# Patient Record
Sex: FEMALE
[De-identification: names, ages, dates, MRNs, and addresses within clinical notes are randomized per-mention and may not be internally consistent; named-entity substitution may affect disease eponyms.]

---

## 2019-12-18 ENCOUNTER — HOSPITAL ENCOUNTER (OUTPATIENT)
Dept: HOSPITAL 92 - BICMAMMO | Age: 59
Discharge: HOME | End: 2019-12-18
Attending: FAMILY MEDICINE
Payer: COMMERCIAL

## 2019-12-18 DIAGNOSIS — Z13.820: ICD-10-CM

## 2019-12-18 DIAGNOSIS — Z12.31: Primary | ICD-10-CM

## 2019-12-18 DIAGNOSIS — M85.80: ICD-10-CM

## 2019-12-18 PROCEDURE — 77063 BREAST TOMOSYNTHESIS BI: CPT

## 2019-12-18 PROCEDURE — 77080 DXA BONE DENSITY AXIAL: CPT

## 2019-12-18 PROCEDURE — 77067 SCR MAMMO BI INCL CAD: CPT

## 2019-12-18 NOTE — BD
EXAM: Bone densitometry using DEXA



HISTORY:

58 yo female. Screening for postmenopausal osteoporosis



FINDINGS:

L1--bone mineral density 0.818 g/sq cm; T score -1.6 ; Z score -0.4



L2--bone mineral density 0.787 g/sq cm; T score -2.2 ; Z score -0.9



L3--bone mineral density 0.803 g/sq cm; T score -2.6 ; Z score -1.1



L4--bone mineral density 0.839 g/sq cm; T score -2.0 ; Z score -0.6



Total L1-L4--bone mineral density 0.8, 0.2 g/sq cm; T score -2.1 ; Z score -0.8



Left femoral neck--bone mineral density0.774; T score -0.7 ; Z score 0.6



Total proximal left femur--bone mineral density 1.063; T score 1.0 ; Z score 1.9



The 10 year fracture risk for a major osteoporotic fracture is 6.5% and for a hip fracture is 0.3%.



IMPRESSION: Osteopenia 



Reported By: Travis Cortes 

Electronically Signed:  12/18/2019 2:53 PM

## 2019-12-18 NOTE — MMO
Bilateral MAMMO Bilat Screen [facility].

 

CLINICAL HISTORY:

Patient is 59 years old and is seen for screening. The patient has no family

history of breast cancer.  The patient has no personal history of cancer.

 

VIEWS:

The views performed were:  bilateral craniocaudal with tomosynthesis; bilateral

mediolateral oblique with tomosynthesis; and bilateral exaggerated craniocaudal.

 

This study has been interpreted with the assistance of computer-aided detection.

 

MAMMOGRAM FINDINGS:

The breasts are heterogeneously dense, which could obscure a lesion on

mammography.

 

There are no suspicious masses, suspicious calcifications, or new areas of

architectural distortion.

 

IMPRESSION:

THERE IS NO MAMMOGRAPHIC EVIDENCE OF MALIGNANCY.

 

A ROUTINE FOLLOW-UP MAMMOGRAM IN 1 YEAR IS RECOMMENDED.

 

THE RESULTS OF THIS EXAM WERE SENT TO THE PATIENT.

 

ACR BI-RADS Category 1 - Negative

 

MAMMOGRAPHY NOTE:

 1. A negative mammogram report should not delay a biopsy if a dominant of

 clinically suspicious mass is present.

 2. Approximately 10% to 15% of breast cancers are not detected by

 mammography.

 3. Adenosis and dense breasts may obscure an underlying neoplasm.

 

 

Reported by: RANDY MATTHEWS MD

Electonically Signed: [id number]